# Patient Record
Sex: MALE | Race: BLACK OR AFRICAN AMERICAN | NOT HISPANIC OR LATINO | Employment: FULL TIME | URBAN - METROPOLITAN AREA
[De-identification: names, ages, dates, MRNs, and addresses within clinical notes are randomized per-mention and may not be internally consistent; named-entity substitution may affect disease eponyms.]

---

## 2023-02-22 ENCOUNTER — TELEPHONE (OUTPATIENT)
Dept: OTHER | Facility: OTHER | Age: 43
End: 2023-02-22

## 2023-02-22 NOTE — TELEPHONE ENCOUNTER
Patient called in to schedule NP OV with provider, Dr Santos Rutherford post spouse's visit  Please follow up and schedule patient  New chart created, insurance information and emergency contact, email address, pharmacy, and link sent to set up My Chart account

## 2023-02-27 ENCOUNTER — OFFICE VISIT (OUTPATIENT)
Dept: FAMILY MEDICINE CLINIC | Facility: CLINIC | Age: 43
End: 2023-02-27

## 2023-02-27 VITALS
HEART RATE: 87 BPM | BODY MASS INDEX: 32.58 KG/M2 | RESPIRATION RATE: 18 BRPM | OXYGEN SATURATION: 99 % | HEIGHT: 69 IN | DIASTOLIC BLOOD PRESSURE: 69 MMHG | WEIGHT: 220 LBS | TEMPERATURE: 98.4 F | SYSTOLIC BLOOD PRESSURE: 126 MMHG

## 2023-02-27 DIAGNOSIS — Z00.00 ANNUAL PHYSICAL EXAM: Primary | ICD-10-CM

## 2023-02-27 DIAGNOSIS — Z13.6 SCREENING FOR CARDIOVASCULAR CONDITION: ICD-10-CM

## 2023-02-27 DIAGNOSIS — R39.15 URINARY URGENCY: ICD-10-CM

## 2023-02-27 DIAGNOSIS — E78.2 MIXED HYPERLIPIDEMIA: ICD-10-CM

## 2023-02-27 DIAGNOSIS — S49.91XS RIGHT SHOULDER INJURY, SEQUELA: ICD-10-CM

## 2023-02-27 DIAGNOSIS — Z11.59 NEED FOR HEPATITIS C SCREENING TEST: ICD-10-CM

## 2023-02-27 DIAGNOSIS — Z13.29 SCREENING FOR THYROID DISORDER: ICD-10-CM

## 2023-02-27 DIAGNOSIS — Z13.1 SCREENING FOR DIABETES MELLITUS: ICD-10-CM

## 2023-02-27 DIAGNOSIS — R79.89 ELEVATED SERUM CREATININE: ICD-10-CM

## 2023-02-27 DIAGNOSIS — Z11.4 SCREENING FOR HIV (HUMAN IMMUNODEFICIENCY VIRUS): ICD-10-CM

## 2023-02-27 DIAGNOSIS — R06.83 SNORING: ICD-10-CM

## 2023-02-27 RX ORDER — LIDOCAINE 50 MG/G
PATCH TOPICAL
COMMUNITY
Start: 2023-02-15

## 2023-02-27 NOTE — PROGRESS NOTES
ADULT [facility]NUAL [facility]    NAME: Chadd Knight  AGE: 40 y o  SEX: male  : 1980     DATE: 3/1/2023     Assessment and Plan:     Problem List Items Addressed This Visit    None  Visit Diagnoses     Annual physical exam    -  Primary  Screening labs ordered  He is agreeable to HIV and hep c screening  Discussed diet and exercise  Immunizations are up to date per patient (had for his employer)  Snoring      epworth sleep score is 11  Refer for home sleep study and f/u with sleep specialist pending results  Sitting and reading: Moderate chance of dozing  Watching TV: High chance of dozing  Sitting, inactive in a public place (e g  a theatre or a meeting): Moderate chance of dozing  As a passenger in a car for an hour without a break: Slight chance of dozing  Lying down to rest in the afternoon when circumstances permit: Slight chance of dozing  Sitting and talking to someone: Would never doze  Sitting quietly after a lunch without alcohol: Slight chance of dozing  In a car, while stopped for a few minutes in traffic: Slight chance of dozing  Total score: 11      BMI 32 0-32 9,adult      Patient requested referral to weight management       Need for hepatitis C screening test        Screening for HIV (human immunodeficiency virus)        Screening for cardiovascular condition        Screening for diabetes mellitus        Screening for thyroid disorder        Urinary urgency  Will check urinalysis with his labs  Right shoulder injury, sequela      Secondary to work injury  Patient has appt with othopedics at [facility] in 2 days  Immunizations and preventive care screenings were discussed with patient today  Appropriate education was printed on patient's after visit summary  Discussed risks and benefits of prostate cancer screening   We discussed the controversial history of PSA screening for prostate cancer in the [place] as well as the risk of over detection and over treatment of prostate cancer by way of PSA screening  The patient understands that PSA blood testing is an imperfect way to screen for prostate cancer and that elevated PSA levels in the blood may also be caused by infection, inflammation, prostatic trauma or manipulation, urological procedures, or by benign prostatic enlargement  The role of the digital rectal examination in prostate cancer screening was also discussed and I discussed with him that there is large interobserver variability in the findings of digital rectal examination  Counseling:  Alcohol/drug use: discussed moderation in alcohol intake, the recommendations for healthy alcohol use, and avoidance of illicit drug use  Dental Health: discussed importance of regular tooth brushing, flossing, and dental visits  Injury prevention: discussed safety/seat belts, safety helmets, smoke detectors, carbon dioxide detectors, and smoking near bedding or upholstery  Exercise: the importance of regular exercise/physical activity was discussed  Recommend exercise 3-5 times per week for at least 30 minutes  No follow-ups on file  Chief Complaint:     Chief Complaint   Patient presents with   • New Patient Visit     Establish care, snoring while sleeping very bad cant sleep-has woken up gasping for air, pt states he rotates work schedules and cant find ease to sleep  Patient is a   Requests to see nutritionist- feels that he needs to slim down a little from where he is and a healthier life style  Request refill on patch from work related injury for shoulder at night  Taking OTC motrin       History of Present Illness:     Adult Annual Physical   Patient here today as a new patient for a comprehensive physical exam    Previous PCP = none  Immunizations=reportedly up to date  Thinks is tetanus vaccine when he started new job 2 years ago  He is a   Chronic medical problems=none    The patient reports snoring  states that he has snored more heavily in last 18 months  He has been having what sound like apneic spells  He states that he will wake up at night gasping for air  Not necessarily tired upon awakening in AM  Gets tired during the day  He is also concerned about some urinary urgency he has been having for the last year  He drinks 5-8 bottles of water per day  As a result of this will urinate frequently  When he has to go, has the sensation of urgency  States that this typically only occurs when he is drinking more than 4 bottles of water per day  No incontinence  No dysuria  No hematuria  Occasional nocturia  He was seen in ED on 2/14/23 at [facility] for an injury that occurred at work  No records for review  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  He reportedly had xray of wrist completed there but no other imaging studies  Was given rx for lidocaine patch but has not picked this up  Has appt at [facility] to see orthopedist in 2 days  Some pain in neck and right posterior shoulder today  No radiation of his pain down right upper extremity  Right shoulder is painful with certain movements  He is right hand dominant  He is out of work right now until evaluated by the orthopedist          Diet and Physical Activity  Diet/Nutrition: states that he could have a healthier diet  no fast foods  occasional juice  no soda  likes dessert      Exercise: runs, boxes and lifts weights for exercise         Depression Screening  PHQ-2/9 Depression Screening    Little interest or pleasure in doing things: 0 - not at all  Feeling down, depressed, or hopeless: 0 - not at all  PHQ-2 Score: 0  PHQ-2 Interpretation: Negative depression screen       General Health  Sleep: sleeps 4-5 hours per night  wakes up from his snoring  Hearing: normal - bilateral   Vision: no vision problems  Dental: regular dental visits          Health  Symptoms include: urinary urgency     Review of Systems:     Review of Systems   Past Medical History:     History reviewed  No pertinent past medical history  Past Surgical History:     History reviewed  No pertinent surgical history  Family History:     Family History   Problem Relation Age of Onset   • Hyperlipidemia Mother       Social History:     Social History     Socioeconomic History   • Marital status: /Civil Union     Spouse name: None   • Number of children: None   • Years of education: None   • Highest education level: None   Occupational History   • None   Tobacco Use   • Smoking status: Never   • Smokeless tobacco: Never   Vaping Use   • Vaping Use: Never used   Substance and Sexual Activity   • Alcohol use: Never   • Drug use: Never   • Sexual activity: Yes     Partners: Female     Birth control/protection: None   Other Topics Concern   • None   Social History Narrative    Is a          Social Determinants of Health     Financial Resource Strain: Not on file   Food Insecurity: Not on file   Transportation Needs: Not on file   Physical Activity: Not on file   Stress: Not on file   Social Connections: Not on file   Intimate Partner Violence: Not on file   Housing Stability: Not on file      Current Medications:     Current Outpatient Medications   Medication Sig Dispense Refill   • lidocaine (LIDODERM) 5 %        No current facility-administered medications for this visit  Allergies:     No Known Allergies   Physical Exam:     /69 (BP Location: Left arm, Patient Position: Sitting, Cuff Size: Adult)   Pulse 87   Temp 98 4 °F (36 9 °C) (Tympanic)   Resp 18   Ht 5' 9" (1 753 m)   Wt 99 8 kg (220 lb)   SpO2 99%   BMI 32 49 kg/m²     Physical Exam  Vitals and nursing note reviewed  Constitutional:       General: He is not in acute distress  Appearance: Normal appearance  He is not ill-appearing, toxic-appearing or diaphoretic     HENT: Head: Normocephalic and atraumatic  Right Ear: Tympanic membrane normal       Left Ear: Tympanic membrane normal       Nose: Nose normal       Mouth/Throat:      Mouth: Mucous membranes are moist       Pharynx: No oropharyngeal exudate or posterior oropharyngeal erythema  Eyes:      Extraocular Movements: Extraocular movements intact  Conjunctiva/sclera: Conjunctivae normal       Pupils: Pupils are equal, round, and reactive to light  Cardiovascular:      Rate and Rhythm: Normal rate and regular rhythm  Heart sounds: No murmur heard  Pulmonary:      Effort: Pulmonary effort is normal  No respiratory distress  Breath sounds: Normal breath sounds  No wheezing or rales  Abdominal:      General: Abdomen is flat  Bowel sounds are normal  There is no distension  Palpations: Abdomen is soft  There is no mass  Tenderness: There is no abdominal tenderness  There is no guarding  Musculoskeletal:      Cervical back: Normal range of motion and neck supple  Comments: Cervical spine with full range of motion  There is no tenderness or paravertebral muscle spasm on right  Has right sided trapezius spasm and tenderness  He has full flexion, abduction and external rotation of right shoulder  Decreased internal rotation  Negative empty can test    Negative apprehension test    Right wrist with tenderness dorsum of wrist  No swelling or deformity of wrist     Lymphadenopathy:      Cervical: No cervical adenopathy  Skin:     General: Skin is warm and dry  Findings: No rash  Neurological:      General: No focal deficit present  Mental Status: He is alert and oriented to person, place, and time  Cranial Nerves: No cranial nerve deficit  Sensory: No sensory deficit  Motor: No weakness        Coordination: Coordination normal       Gait: Gait normal       Deep Tendon Reflexes: Reflexes normal    Psychiatric:         Mood and Affect: Mood normal           Bruce Galvez [facility]

## 2023-03-03 ENCOUNTER — TELEPHONE (OUTPATIENT)
Dept: FAMILY MEDICINE CLINIC | Facility: CLINIC | Age: 43
End: 2023-03-03

## 2023-03-03 PROBLEM — E78.2 MIXED HYPERLIPIDEMIA: Status: ACTIVE | Noted: 2023-03-03

## 2023-03-03 PROBLEM — R79.89 ELEVATED SERUM CREATININE: Status: ACTIVE | Noted: 2023-03-03

## 2023-03-03 LAB
ALBUMIN SERPL-MCNC: 5 G/DL (ref 4–5)
ALBUMIN/GLOB SERPL: 2.2 {RATIO} (ref 1.2–2.2)
ALP SERPL-CCNC: 63 IU/L (ref 44–121)
ALT SERPL-CCNC: 15 IU/L (ref 0–44)
AST SERPL-CCNC: 15 IU/L (ref 0–40)
BILIRUB SERPL-MCNC: 0.8 MG/DL (ref 0–1.2)
BUN SERPL-MCNC: 14 MG/DL (ref 6–24)
BUN/CREAT SERPL: 10 (ref 9–20)
CALCIUM SERPL-MCNC: 10 MG/DL (ref 8.7–10.2)
CHLORIDE SERPL-SCNC: 102 MMOL/L (ref 96–106)
CHOLEST SERPL-MCNC: 230 MG/DL (ref 100–199)
CHOLEST/HDLC SERPL: 4.6 RATIO (ref 0–5)
CO2 SERPL-SCNC: 26 MMOL/L (ref 20–29)
CREAT SERPL-MCNC: 1.41 MG/DL (ref 0.76–1.27)
EGFR: 64 ML/MIN/1.73
GLOBULIN SER-MCNC: 2.3 G/DL (ref 1.5–4.5)
GLUCOSE SERPL-MCNC: 82 MG/DL (ref 70–99)
HCV AB S/CO SERPL IA: NON REACTIVE
HDLC SERPL-MCNC: 50 MG/DL
HIV 1+2 AB+HIV1 P24 AG SERPL QL IA: NON REACTIVE
LDLC SERPL CALC-MCNC: 171 MG/DL (ref 0–99)
POTASSIUM SERPL-SCNC: 4.8 MMOL/L (ref 3.5–5.2)
PROT SERPL-MCNC: 7.3 G/DL (ref 6–8.5)
SL AMB VLDL CHOLESTEROL CALC: 9 MG/DL (ref 5–40)
SODIUM SERPL-SCNC: 139 MMOL/L (ref 134–144)
TRIGL SERPL-MCNC: 56 MG/DL (ref 0–149)
TSH SERPL DL<=0.005 MIU/L-ACNC: 1 UIU/ML (ref 0.45–4.5)

## 2023-03-03 NOTE — TELEPHONE ENCOUNTER
patient calls back for results,patient aware of results:  patient confirmed he was fasting for labs, states he occasionally takes Advil or aleve but not everyday-for example,he will take a week and then not take for months  States that he has taking protein and vitamins in the past but nothing current  patient aware to recheck kidney function in 2 month and lipid in 6 months followed by an OV, advised patient to please call if any questions arries  M FOR PT TO CALL BACK FOR RESULTS    ----- Message from Elie Sue DO sent at 3/3/2023  8:09 AM EST -----  Can let patient know that I received his lab test results  His HIV test was negative  Hep c screening also negative  His blood sugar was normal  Thyroid was normal    His kidney function was slightly diminished  I have no prior labs for comparison  He is not taking any prescription medications that would affect kidney function  Wondering if he has been taking otc advil or aleve on regular basis? Or any other supplements? Could be that he is just a little dry/dehydrated  In addition, his cholesterol was elevated  Total cholesterol was 230 and LDL cholesterol was 171  Do not recommend medication at this time as his cardiovascular risks score is low at 3 3% but do recommend that he follow a low fat, low cholesterol/heart healthy diet  Recheck his lipids in 6 months followed by appt to review the labs  I would like to recheck kidney function sooner (2 months) to ensure this remains stable

## 2023-03-28 ENCOUNTER — TELEPHONE (OUTPATIENT)
Dept: SLEEP CENTER | Facility: CLINIC | Age: 43
End: 2023-03-28

## 2023-03-28 NOTE — TELEPHONE ENCOUNTER
----- Message from Donna Becerra MD sent at 3/28/2023  8:54 AM EDT -----  Approved    ----- Message -----  From: Waldemar Cruz  Sent: 3/28/2023   8:13 AM EDT  To: Sleep Medicine [facility] Provider    This Home sleep study needs approval      If approved please sign and return to clerical pool  If denied please include reasons why  Also provide alternative testing if warranted  Please sign and return to clerical pool

## 2023-04-25 ENCOUNTER — HOSPITAL ENCOUNTER (OUTPATIENT)
Dept: SLEEP CENTER | Facility: HOSPITAL | Age: 43
Discharge: HOME | End: 2023-04-25

## 2023-04-25 DIAGNOSIS — R06.83 SNORING: ICD-10-CM

## 2023-04-28 NOTE — PROGRESS NOTES
Home Sleep Study Documentation    HOME STUDY DEVICE: Noxturnal no                                           Babs G3 yes      Pre-Sleep Home Study:    Set-up and instructions performed by: MA-Tech    Technician performed demonstration for Patient: yes    Return demonstration performed by Patient: yes    Written instructions provided to Patient: yes    Patient signed consent form: yes        Post-Sleep Home Study:    Additional comments by Patient:         Home Sleep Study Failed:no:    Failure reason: N/A    Reported or Detected: N/A    Scored by: ANISHA Dillon

## 2023-05-01 ENCOUNTER — TELEPHONE (OUTPATIENT)
Dept: FAMILY MEDICINE CLINIC | Facility: CLINIC | Age: 43
End: 2023-05-01

## 2023-05-01 NOTE — TELEPHONE ENCOUNTER
----- Message from Joanie Mills DO sent at 4/28/2023  1:25 PM EDT -----  Can let patient know that his sleep study showed sleep apnea  Sleep specialist should be contacting patient to set up CPAP

## 2023-05-01 NOTE — TELEPHONE ENCOUNTER
Results -    Patient is aware of sleep study results and advice  Patient aware to await call from sleep specialist to set up CPAP

## 2023-05-10 ENCOUNTER — TELEPHONE (OUTPATIENT)
Dept: SLEEP CENTER | Facility: CLINIC | Age: 43
End: 2023-05-10

## 2023-05-10 NOTE — TELEPHONE ENCOUNTER
Per Dr Emma Vaughn sleep study shows moderate MICHELLE and PCP notified the patient of sleep study results      Spoke to the patient scheduled consult

## 2023-05-17 ENCOUNTER — TELEPHONE (OUTPATIENT)
Dept: SLEEP CENTER | Facility: CLINIC | Age: 43
End: 2023-05-17

## 2023-05-17 ENCOUNTER — OFFICE VISIT (OUTPATIENT)
Dept: SLEEP CENTER | Facility: CLINIC | Age: 43
End: 2023-05-17

## 2023-05-17 VITALS
DIASTOLIC BLOOD PRESSURE: 76 MMHG | HEIGHT: 70 IN | OXYGEN SATURATION: 99 % | BODY MASS INDEX: 31.07 KG/M2 | WEIGHT: 217 LBS | HEART RATE: 67 BPM | SYSTOLIC BLOOD PRESSURE: 118 MMHG

## 2023-05-17 DIAGNOSIS — J30.2 SEASONAL ALLERGIES: ICD-10-CM

## 2023-05-17 DIAGNOSIS — G47.26 SHIFT WORK SLEEP DISORDER: ICD-10-CM

## 2023-05-17 DIAGNOSIS — G47.33 OBSTRUCTIVE SLEEP APNEA: Primary | ICD-10-CM

## 2023-05-17 DIAGNOSIS — E66.9 OBESITY (BMI 30-39.9): ICD-10-CM

## 2023-05-17 DIAGNOSIS — G47.09 OTHER INSOMNIA: ICD-10-CM

## 2023-05-17 DIAGNOSIS — G47.10 HYPERSOMNOLENCE: ICD-10-CM

## 2023-05-17 NOTE — PATIENT INSTRUCTIONS
Nasal symptoms may [improve] with regular nasal saline rinse up to twice a day, followed by topical nasal steroid (e g  OTC Flonase, Nasacort) once a day if necessary  Nursing Support:  When: Monday through Friday 7A-5PM except holidays  Where: Our direct line is [phone]  If you are having a true emergency please call 911  In the event that the line is busy or it is after hours please leave a voice message and we will return your call  Please speak clearly, leaving your full name, birth date, best number to reach you and the reason for your call  Medication refills: We will need the name of the medication, the dosage, the ordering provider, whether you get a 30 or 90 day refill, and the pharmacy name and address  Medications will be ordered by the provider only  Nurses cannot call in prescriptions  Please allow 7 days for medication refills  Physician requested updates: If your provider requested that you call with an update after starting medication, please be ready to provide us the medication and dosage, what time you take your medication, the time you attempt to fall asleep, time you fall asleep, when you wake up, and what time you get out of bed  Sleep Study Results: We will contact you with sleep study results and/or next steps after the physician has reviewed your testing

## 2023-05-17 NOTE — TELEPHONE ENCOUNTER
DME setup 6/6/2023 in [facility]  Rx for CPAP and clinicals sent to [facility] via [place]

## 2023-05-17 NOTE — PROGRESS NOTES
Consultation - Sleep Center   Marie Broussard  40 y o  male  :1980  CQZ:[id number]  DOS:2023    Physician Requesting Consult: Kateryna Meneses DO             Reason for Consult : At your kind request I saw Marie Broussard for initial sleep evaluation today  Home sleep testing was undertaken to evaluate for sleep disordered breathing and patient is here to review results and further options  The study demonstrated a respiratory event index (CAL) of 13 2  The lowest SpO2 recorded is 86% and 12 minutes during the study was spent with oxygen saturations below 90%  The snore index was 10%  PFSH, Problem List, Medications & Allergies were reviewed in EMR  Nell Dey  has no past medical history on file  He has a current medication list which includes the following prescription(s): lidocaine  HPI: His study was undertaken for wife's complaints of loud snoring and witnessed apneas of several years duration  He is not aware of snoring or breathing difficulties during sleep  Other Complaints: He works rotating shifts and has sleep difficulties when he is on nights    Restless Leg Syndrome: reports no suggestive symptoms  Parasomnia: no features reported    Sleep Routine (on average): Typical Bedtime: Between 10 and 11 PM gets OOB: Between 5 and 6 AM TIB:6-7 hrs  Sleep latency:<  30 minutes Sleep Interruptions:2-3/nite struggles to fall back asleep  Awakens: spontaneously  Upon awakening: is not always refreshed  [He estimates getting 4-5 hrs sleep when working days and even less when he works nights   ] Asael reports excessive sleepiness and struggles to get through night shift  He takes planned naps during his lunch breaks and on review, rated [himself] at 17 /24 on the [place] Sleepiness Scale  Habits:   reports that he has never smoked  He has never used smokeless tobacco ;  reports no history of alcohol use ;[ reports no history of drug use  ];[  E-Cigarette/Vaping   • "E-Cigarette Use Never User    ]; Caffeine use:moderate; Exercise routine: regular  Family History: Both parents snore  ROS: Significant for weight has been stable  He has nasal symptoms due to seasonal allergies  A 10 point review of systems was otherwise negative  EXAM:  /76 (BP Location: Right arm, Patient Position: Sitting, Cuff Size: Standard)   Pulse 67   Ht 5' 10\" (1 778 m)   Wt 98 4 kg (217 lb)   SpO2 99%   BMI 31 14 kg/m²    General: Well groomed male, well appearing, in no apparent distress  Neurological: Alert and orientated; cooperative; Cranial nerves intact;    Psychiatric: Speech: Clear and coherent; normal mood, affect & thought   Skin: Warm and dry; Color& Hydration good; no facial rashes or lesions   HEENT:  Craniofacial anatomy: normal Sinuses: Non-tender  TMJ: Normal   Eyes: EOM's intact; conjunctiva/corneas clear   Ears: Externally appear normal     Nasal Airway: is patent Septum: Intact; Mucosa: Normal; Turbinates: Normal; Rhinorrhea: None  Mouth: Lips: Normal posture; Dentition: normal   Mucosa: Moist; Hard Palate:normal    Oropharryx: crowded and AP narrowing Tongue: Mallampati:Class IV, Mobile and ScallopedSoft Palate:  redundant  Tonsils: absent  Neck:; [Neck Circumference: 16 \";] Supple; no abnormal masses; Thyroid: Normal  Trachea: Central     Lymph: No cervical or submandibular Lymhadenopathy  Heart: S1,S2 normal; RRR; no gallop; no murmur s  Lungs: Respiratory Effort: Normal  Air entry good bilaterally  No wheezes  No rales  Abdomen: Obese, soft & non-tender    Extremities: No pedal edema  No clubbing or cyanosis  Musculoskeletal:  Motor normal; Gait: Normal        IMPRESSION: Primary/Secondary Sleep Diagnoses (to Medical or Psych conditions) & Comorbidities   1  Obstructive sleep apnea  Cpap DME      2  Other insomnia        3  Shift work sleep disorder        4  Hypersomnolence        5  Seasonal allergies        6   Obesity (BMI 30-39 9)         " "    PLAN:  1  I reviewed results of the Sleep studies with the patient  2  With respect to above conditions, I counseled on pathophysiology, diagnosis, treatment options, risks and benefits; inter-relationship and effects on symptoms and comorbidities; risks of no treatment; costs and insurance aspects  3  Patient elected positive airway pressure therapy and care coordinated with the DME provider for set-up  4  Need for compliance with therapy and weight reduction were emphasized  5   Multi component Cognitive behavioral therapy for Insomnia undertaken - Sleep Restriction, Stimulus control, Relaxation techniques and Sleep hygiene were discussed  6   He would like to change to regular shift is unable to at present  I advised on strategies to cope with shiftwork  7   Nasal symptoms may improve with regular nasal saline rinse up to twice a day, followed by topical nasal steroid (e g  OTC Flonase, Nasacort) once a day if necessary  8  Follow-up to be scheduled in 2 months to monitor compliance, progress and to adjust therapy  Thank you for allowing me to participate in the care of this patient  I will keep you apprised of developments  Sincerely,      Authenticated electronically on 78/63/60   Board Certified Specialist     Portions of the record may have been created with voice recognition software  Occasional wrong word or \"sound a like\" substitutions may have occurred due to the inherent limitations of voice recognition software  There may also be notations and random deletions of words or characters from malfunctioning software  Read the chart carefully and recognize, using context, where substitutions/deletions have occurred       "

## 2023-05-18 LAB
DME PARACHUTE DELIVERY DATE EXPECTED: NORMAL
DME PARACHUTE DELIVERY DATE REQUESTED: NORMAL
DME PARACHUTE DELIVERY NOTE: NORMAL
DME PARACHUTE ITEM DESCRIPTION: NORMAL
DME PARACHUTE ORDER STATUS: NORMAL
DME PARACHUTE SUPPLIER NAME: NORMAL
DME PARACHUTE SUPPLIER PHONE: NORMAL

## 2023-06-06 LAB

## 2023-06-07 ENCOUNTER — TELEPHONE (OUTPATIENT)
Dept: PULMONOLOGY | Facility: CLINIC | Age: 43
End: 2023-06-07

## 2023-06-07 NOTE — TELEPHONE ENCOUNTER
Pt  was set up on 6/6/2023 by Radha Bassett on a ResMed S10 CPAP 5-15 cm EPR 3 and mask AirTouch F20 M

## 2023-08-31 ENCOUNTER — TELEPHONE (OUTPATIENT)
Dept: SLEEP CENTER | Facility: CLINIC | Age: 43
End: 2023-08-31

## 2023-08-31 DIAGNOSIS — G47.33 OBSTRUCTIVE SLEEP APNEA (ADULT) (PEDIATRIC): Primary | ICD-10-CM

## 2023-09-01 ENCOUNTER — TELEPHONE (OUTPATIENT)
Dept: SLEEP CENTER | Facility: CLINIC | Age: 43
End: 2023-09-01

## 2023-09-06 ENCOUNTER — OFFICE VISIT (OUTPATIENT)
Dept: SLEEP CENTER | Facility: CLINIC | Age: 43
End: 2023-09-06
Payer: COMMERCIAL

## 2023-09-06 VITALS
WEIGHT: 220.4 LBS | DIASTOLIC BLOOD PRESSURE: 74 MMHG | HEIGHT: 70 IN | SYSTOLIC BLOOD PRESSURE: 110 MMHG | BODY MASS INDEX: 31.55 KG/M2

## 2023-09-06 DIAGNOSIS — G47.33 OBSTRUCTIVE SLEEP APNEA (ADULT) (PEDIATRIC): Primary | ICD-10-CM

## 2023-09-06 DIAGNOSIS — G47.09 OTHER INSOMNIA: ICD-10-CM

## 2023-09-06 DIAGNOSIS — R40.0 DAYTIME SLEEPINESS: ICD-10-CM

## 2023-09-06 DIAGNOSIS — J30.2 SEASONAL ALLERGIES: ICD-10-CM

## 2023-09-06 DIAGNOSIS — E66.9 OBESITY (BMI 30-39.9): ICD-10-CM

## 2023-09-06 DIAGNOSIS — G89.4 CHRONIC PAIN DISORDER: ICD-10-CM

## 2023-09-06 LAB

## 2023-09-06 PROCEDURE — 99214 OFFICE O/P EST MOD 30 MIN: CPT | Performed by: INTERNAL MEDICINE

## 2023-09-06 NOTE — PROGRESS NOTES
Follow-Up Note - Sleep Center   Pavan Kiran. Mack Wilson  40 y.o. male  :1980  XTB:[id number]  DOS:2023    CC: I saw this patient for follow-up in clinic today for Sleep disordered breathing, Coexisting Sleep and Medical Problems. Interval changes: Auto titrating PAP was prescribed but he has not been able to use the device because of recent nerve injury involving head and neck. The study demonstrated a respiratory event index (CAL) of 13.2. The lowest SpO2 recorded is 86% and 12 minutes during the study was spent with oxygen saturations below 90%. The snore index was 10%. PFSH, Problem List, Medications & Allergies were reviewed in EMR. He  has no past medical history on file. He has a current medication list which includes the following prescription(s): gabapentin, meloxicam, and lidocaine. PHYSIOLOGICAL DATA REVIEW : Discontinued use of PAP approximately a month ago.;.    SUBJECTIVE: With respect to use of PAP, Asael  is experiencing significant adverse effects:mask discomfort. He was refitted and feels he will be able to reinitiate CPAP. Taylor Linares Sleep Routine: Abigail Jarvis reports he has been out of work and now getting 4-5 hrs sleep because of neck pain.; he has difficulty initiating and maintaining sleep . He arises spontaneously and is not always refreshed. Asael reports excessive daytime sleepiness, [feels like napping & does when has the opportunity.] He rated [himself] at   /24 on the [place] Sleepiness Scale. Other issues: None reported. Habits:[ reports that he has never smoked. He has never used smokeless tobacco.], [ reports no history of alcohol use.], [ reports no history of drug use.], Caffeine use:very little; Exercise routine: regular-in physical therapy. ROS: Significant for weight fluctuates in the range of a few pounds. Allergies are controlled. Taylor Linares     EXAM: /74   Ht 5' 10" (1.778 m)   Wt 100 kg (220 lb 6.4 oz)   BMI 31.62 kg/m²     Wt Readings from Last 3 Encounters:   09/06/23 100 kg (220 lb 6.4 oz)   05/17/23 98.4 kg (217 lb)   02/27/23 99.8 kg (220 lb)      Patient is well groomed; well appearing. CNS: Alert, orientated, speech clear & coherent  Psych: cooperative and in no distress. Mental state: Appears normal.  H&N: EOMI; NC/AT: No facial pressure marks, no rashes. Skin/Extrem: col & hydration normal; no edema  Resp: Respiratory effort is normal  Physical findings otherwise essentially unchanged from previous. IMPRESSION: Problem List Items & Comorbidities Addressed this Visit    1. Obstructive sleep apnea (adult) (pediatric)  PAP DME Resupply/Reorder      2. Other insomnia        3. Chronic pain disorder        4. Daytime sleepiness        5. Seasonal allergies        6. Obesity (BMI 30-39. 9)            PLAN:  1. I reviewed results of prior studies and physiologic data with the patient. 2. I discussed treatment options with risks and benefits. 3. Treatment with  PAP is medically necessary and Brandy Yang is agreable to reinitiate use. 4. Care of equipment, methods to improve comfort using PAP and importance of compliance with therapy were discussed. 5. Pressure setting: continue 5-15 cmH2O.    6. Rx provided to replace supplies and Care coordinated with DME provider. 7. Multi component Cognitive behavioral therapy for Insomnia undertaken - Sleep Restriction, Stimulus control, Relaxation techniques and Sleep hygiene were discussed. 8. He is under care for chronic pain. 9. Discussed strategies for weight reduction. 10. Follow-up is advised in 3 months to monitor progress, compliance and to adjust therapy. Thank you for allowing me to participate in the care of this patient. Sincerely,     Authenticated electronically on 08/52/15   Board Certified Specialist     Portions of the record may have been created with voice recognition software.  Occasional wrong word or "sound a like" substitutions may have occurred due to the inherent limitations of voice recognition software. There may also be notations and random deletions of words or characters from malfunctioning software. Read the chart carefully and recognize, using context, where substitutions/deletions have occurred.

## 2023-09-06 NOTE — PATIENT INSTRUCTIONS

## 2023-09-08 ENCOUNTER — TELEPHONE (OUTPATIENT)
Dept: SLEEP CENTER | Facility: CLINIC | Age: 43
End: 2023-09-08

## 2023-09-11 LAB

## 2023-09-12 ENCOUNTER — CONSULT (OUTPATIENT)
Dept: BARIATRICS | Facility: CLINIC | Age: 43
End: 2023-09-12
Payer: COMMERCIAL

## 2023-09-12 VITALS
HEART RATE: 70 BPM | RESPIRATION RATE: 16 BRPM | HEIGHT: 70 IN | BODY MASS INDEX: 31.32 KG/M2 | DIASTOLIC BLOOD PRESSURE: 84 MMHG | WEIGHT: 218.8 LBS | SYSTOLIC BLOOD PRESSURE: 118 MMHG

## 2023-09-12 DIAGNOSIS — E66.9 OBESITY, CLASS I, BMI 30-34.9: Primary | ICD-10-CM

## 2023-09-12 DIAGNOSIS — R06.83 SNORING: ICD-10-CM

## 2023-09-12 PROBLEM — M54.12 CERVICAL RADICULOPATHY: Status: ACTIVE | Noted: 2023-09-12

## 2023-09-12 PROCEDURE — 99204 OFFICE O/P NEW MOD 45 MIN: CPT | Performed by: PHYSICIAN ASSISTANT

## 2023-09-12 NOTE — PATIENT INSTRUCTIONS
Try these alternative food options:  Protein shakes- Premier, Pure protein, Fairlife, Iconic  Lactose free protein shakes-  Fairlife, Ripple, Iconic, Zambrano  Protein bars- Quest, Pure protein  Ice cream- Yves's, Yasso, Enlightened, Halo Top   Chips- Quest protein chips, Cheese crisps   Bread- 647 wheat, Protein Keto bread, whole wheat yelena bread, low carb/high protein wraps  Pasta- Chickpea pasta, Pasta zero or similar  Rice- Cauliflower rice, quinoa, wild rice, brown rice  Fruits- berries, cantaloupe, peaches, apples, oranges  Yogurt- Ratio (25g protein), Skyr, Two good, Chobani 60 sujit or 100 sujit, Oikos triple zero  Sugar alternatives- Stevia, Monk fruit, Swerve

## 2023-09-12 NOTE — PROGRESS NOTES
Assessment/Plan:    Obesity, Class I, BMI 30-34.9  -Discussed options of HealthyCORE-Intensive Lifestyle Intervention Program, Very Low Calorie Diet-VLCD and Conservative Program and the role of weight loss medications. Explained the importance of making lifestyle changes if utilizing medication to aid in weight loss  -Initial weight loss goal of 5-10% weight loss for improved health  -Screening labs and records reviewed from prior. Discussed the need to get recheck of BMP due to elevated creatinine in the past    -Patient is interested in pursuing conservative plan  -Calorie goals (1700), sample menu (6294-5136 calories), portion size guidelines, and food logging reviewed with the patient. Goals:  -Food log (ie.) [email or web address],[email or web address]  - To drink at least 80oz of water daily. No sugary beverages. -recommend trying to start fast earlier in the night such as 6-7PM.    -discussed trying to have protein based snack at night. Follow up in approximately 4 months  with Non-Surgical Physician/Advanced Practitioner. Diagnoses and all orders for this visit:    Obesity, Class I, BMI 30-34.9    Snoring  -     Ambulatory Referral to Weight Management    BMI 32.0-32.9,adult  -     Ambulatory Referral to Weight Management      I have spent a total time of 48 minutes on 09/12/23 in caring for this patient including Diagnostic results, Prognosis, Risks and benefits of tx options, Instructions for management, Patient and family education, Importance of tx compliance, Risk factor reductions, Impressions and Counseling / Coordination of care. Subjective:   Chief Complaint   Patient presents with   • Consult     MWM consult; waist 38.5in; goal wt 190; pt has sleep apnea       Patient ID: Sandra NaRojelio Stacy  is a 40 y.o. male with excess weight/obesity here to pursue weight management. History reviewed. No pertinent past medical history.     HPI: Here for MWM consult    He started to gain weight recently with a work injury in February. Recently, he started to change around his diet. He was stuck about 225lb. He has tried to increase water intake and cut back on sugars. He has apple watch and has been trying to hit 10,000 steps at least 3 x week. He has cut out most bread also and is doing intermittent fasting. Also started to do fiber, psyllium. Obesity/Excess Weight:  Severity: class I with MICHELLE and HLD  Onset:  Over the last 7 months    Modifiers: Diet and Exercise  Contributing factors: Insufficient Physical Activity  Associated symptoms: comorbid conditions      Goals:under 200lb  Hydration:4-8 bottles of water  Alcohol: none  Exercise:limited due to injuries  Occupation:rotating shifts, on short term disability  Sleep:MICHELLE  Dining out/takeout: about 40 % time      14 hour fast (varying on start time)  Starts to eat 10AM-12PM  Diet Recall:  L:Vegetables and meat  D:varies more  S:currently fruits and vegetables. Can do SF gum to help with cravings. Sometimes nuts    Colonoscopy-Not applicable      The following portions of the patient's history were reviewed and updated as appropriate:   He  has no past medical history on file. He   Patient Active Problem List    Diagnosis Date Noted   • Obesity, Class I, BMI 30-34.9 09/12/2023   • Cervical radiculopathy 09/12/2023   • Obstructive sleep apnea (adult) (pediatric) 08/31/2023   • Obstructive sleep apnea    • Snoring    • Mixed hyperlipidemia 03/03/2023   • Elevated serum creatinine 03/03/2023     He  has no past surgical history on file. His family history includes Hyperlipidemia in his mother. He  reports that he has never smoked. He has never used smokeless tobacco. He reports that he does not drink alcohol and does not use drugs.   Current Outpatient Medications   Medication Sig Dispense Refill   • GABAPENTIN PO Take by mouth     • lidocaine (LIDODERM) 5 %      • MELOXICAM PO Take by mouth       No current facility-administered medications for this visit. Current Outpatient Medications on File Prior to Visit   Medication Sig   • GABAPENTIN PO Take by mouth   • lidocaine (LIDODERM) 5 %    • MELOXICAM PO Take by mouth     No current facility-administered medications on file prior to visit. He has No Known Allergies. .    Review of Systems - Negative except neck pain, shoulder pain, wrist pain and numbness      Objective:    /84   Pulse 70   Resp 16   Ht 5' 10" (1.778 m)   Wt 99.2 kg (218 lb 12.8 oz)   BMI 31.39 kg/m²         Physical Exam  Vitals and nursing note reviewed. Constitutional:       General: He is not in acute distress. Appearance: He is well-developed. He is obese. HENT:      Head: Normocephalic and atraumatic. Eyes:      Conjunctiva/sclera: Conjunctivae normal.   Neck:      Thyroid: No thyromegaly. Pulmonary:      Effort: Pulmonary effort is normal. No respiratory distress. Musculoskeletal:      Comments: Right wrist brace   Skin:     Findings: No rash (visible). Neurological:      Mental Status: He is alert and oriented to person, place, and time.    Psychiatric:         Behavior: Behavior normal.

## 2023-09-12 NOTE — PROGRESS NOTES
Assessment/Plan:    No problem-specific Assessment & Plan notes found for this encounter. Follow up in approximately {FOLLOW UP:43945} with { Follow Up:90410}. Diagnoses and all orders for this visit:    Snoring  -     Ambulatory Referral to Weight Management    BMI 32.0-32.9,adult  -     Ambulatory Referral to Weight Management          Subjective:   Chief Complaint   Patient presents with   • Consult     MWM consult; waist; goal wt; S/B       Patient ID: Mukund Meeks. Orestes Mayberry  is a 40 y.o. male with excess weight/obesity here to pursue weight management. History reviewed. No pertinent past medical history. HPI: Here for MWM consult    Obesity/Excess Weight:  Severity: class I with MICHELLE and HLD  Onset:  ***    Modifiers: {MODIFIERS (Optional):99617}  Contributing factors: {CONTRIBUTING FACTORS (Optional):19455}  Associated symptoms: {ASSOCIATED SYMPTOMS (Optional):21915}      Goals:  Hydration:  Alcohol:   Exercise:  Occupation:  Sleep:  Dining out/takeout:    Colonoscopy-Not applicable      The following portions of the patient's history were reviewed and updated as appropriate:   He  has no past medical history on file. He   Patient Active Problem List    Diagnosis Date Noted   • Obstructive sleep apnea (adult) (pediatric) 08/31/2023   • Obstructive sleep apnea    • Snoring    • Mixed hyperlipidemia 03/03/2023   • Elevated serum creatinine 03/03/2023     He  has no past surgical history on file. His family history includes Hyperlipidemia in his mother. He  reports that he has never smoked. He has never used smokeless tobacco. He reports that he does not drink alcohol and does not use drugs. Current Outpatient Medications   Medication Sig Dispense Refill   • GABAPENTIN PO Take by mouth     • lidocaine (LIDODERM) 5 %      • MELOXICAM PO Take by mouth       No current facility-administered medications for this visit.      Current Outpatient Medications on File Prior to Visit   Medication Sig   • GABAPENTIN PO Take by mouth   • lidocaine (LIDODERM) 5 %    • MELOXICAM PO Take by mouth     No current facility-administered medications on file prior to visit. He has No Known Allergies. .    Review of Systems    Objective:    /84   Resp 16   Ht 5' 10" (1.778 m)   Wt 99.2 kg (218 lb 12.8 oz)   BMI 31.39 kg/m²     Physical Exam

## 2023-09-12 NOTE — ASSESSMENT & PLAN NOTE
-Discussed options of HealthyCORE-Intensive Lifestyle Intervention Program, Very Low Calorie Diet-VLCD and Conservative Program and the role of weight loss medications. Explained the importance of making lifestyle changes if utilizing medication to aid in weight loss  -Initial weight loss goal of 5-10% weight loss for improved health  -Screening labs and records reviewed from prior. Discussed the need to get recheck of BMP due to elevated creatinine in the past    -Patient is interested in pursuing conservative plan  -Calorie goals (1700), sample menu (7635-3330 calories), portion size guidelines, and food logging reviewed with the patient. Goals:  -Food log (ie.) [email or web address]  - To drink at least 80oz of water daily. No sugary beverages. -recommend trying to start fast earlier in the night such as 6-7PM.    -discussed trying to have protein based snack at night.

## 2023-12-11 ENCOUNTER — TELEPHONE (OUTPATIENT)
Dept: SLEEP CENTER | Facility: CLINIC | Age: 43
End: 2023-12-11

## 2024-01-24 LAB

## 2024-05-13 ENCOUNTER — OFFICE VISIT (OUTPATIENT)
Dept: FAMILY MEDICINE CLINIC | Facility: CLINIC | Age: 44
End: 2024-05-13
Payer: COMMERCIAL

## 2024-05-13 VITALS
OXYGEN SATURATION: 98 % | HEART RATE: 90 BPM | WEIGHT: 225 LBS | BODY MASS INDEX: 32.21 KG/M2 | RESPIRATION RATE: 18 BRPM | SYSTOLIC BLOOD PRESSURE: 112 MMHG | DIASTOLIC BLOOD PRESSURE: 72 MMHG | TEMPERATURE: 97.8 F | HEIGHT: 70 IN

## 2024-05-13 DIAGNOSIS — G47.33 OBSTRUCTIVE SLEEP APNEA: ICD-10-CM

## 2024-05-13 DIAGNOSIS — R00.2 PALPITATIONS: Primary | ICD-10-CM

## 2024-05-13 PROCEDURE — 93000 ELECTROCARDIOGRAM COMPLETE: CPT | Performed by: FAMILY MEDICINE

## 2024-05-13 PROCEDURE — 99214 OFFICE O/P EST MOD 30 MIN: CPT | Performed by: FAMILY MEDICINE

## 2024-05-13 NOTE — PROGRESS NOTES
"Name: Asael Ring      : 1980      MRN: [id number]  Encounter Provider: Renetta Sen DO  Encounter Date: 2024   Encounter department: [facility] PRIMARY [facility]    Assessment & Plan     1. Palpitations  -     POCT ECG  -     CBC and differential  -     TSH, 3rd generation with Free T4 reflex  -     Magnesium  -     Echo complete w/ contrast if indicated; Future; Expected date: 2024  -     Holter monitor; Future; Expected date: 2024  EKG in office today showed normal sinus rhythm with no acute changes  Check labs,   Send for holter and echo  F/u after above  2. Obstructive sleep apnea  Was unable to tolerate CPAP         Subjective     HPI  Patient is a 43 year old male with MICHELLE, hyperlipidemia, cervical radiculopathy who is being seen today for complaint of \"heart flutters\".     Gets intermittent \"flutter\" sensation in chest which started a few weeks ago. May be multiple times in one day and some days none at all. Sometimes has associated pressure in chest but most of the time, will get just flutters. Lasts seconds. Never any associated lightheadedness. No shortness of breath. Initially thought the pressure was indigestion and took tums with no relief. Under a lot of stress right now. The flutters don't seem to be correlated with his high stress days.     On gabapentin and meloxicam for a workman's comp injury. Not sure of doses so will call his pharmacy to get the doses for his chart.   Review of Systems    History reviewed. No pertinent past medical history.  History reviewed. No pertinent surgical history.  Family History   Problem Relation Age of Onset   • Hyperlipidemia Mother      Social History     Socioeconomic History   • Marital status: /Civil Union     Spouse name: None   • Number of children: None   • Years of education: None   • Highest education level: None   Occupational History   • None   Tobacco Use   • Smoking status: Never   • Smokeless " "tobacco: Never   Vaping Use   • Vaping status: Never Used   Substance and Sexual Activity   • Alcohol use: Never   • Drug use: Never   • Sexual activity: Yes     Partners: Female     Birth control/protection: None   Other Topics Concern   • None   Social History Narrative    Is a          Social Determinants of Health     Financial Resource Strain: Not on file   Food Insecurity: Not on file   Transportation Needs: Not on file   Physical Activity: Not on file   Stress: Not on file   Social Connections: Not on file   Intimate Partner Violence: Not on file   Housing Stability: Not on file     Current Outpatient Medications on File Prior to Visit   Medication Sig   • GABAPENTIN PO Take by mouth   • lidocaine (LIDODERM) 5 % Apply 1 patch topically if needed   • MELOXICAM PO Take by mouth     No Known Allergies  Immunization History   Administered Date(s) Administered   • COVID-19 PFIZER VACCINE 0.3 ML IM 11/12/2021, 12/03/2021       Objective     /72 (BP Location: Left arm, Patient Position: Sitting, Cuff Size: Adult)   Pulse 90   Temp 97.8 °F (36.6 °C) (Tympanic)   Resp 18   Ht 5' 10\" (1.778 m)   Wt 102 kg (225 lb)   SpO2 98%   BMI 32.28 kg/m²     Physical Exam  Vitals and nursing note reviewed.   Constitutional:       General: He is not in acute distress.     Appearance: Normal appearance. He is not ill-appearing, toxic-appearing or diaphoretic.   HENT:      Head: Normocephalic and atraumatic.   Eyes:      Conjunctiva/sclera: Conjunctivae normal.   Cardiovascular:      Rate and Rhythm: Normal rate and regular rhythm.      Heart sounds: No murmur heard.     No friction rub. No gallop.   Pulmonary:      Effort: Pulmonary effort is normal.      Breath sounds: Normal breath sounds.   Abdominal:      General: Abdomen is flat. Bowel sounds are normal.      Palpations: Abdomen is soft.   Musculoskeletal:      Cervical back: Normal range of motion and neck supple.      Right lower leg: No " edema.      Left lower leg: No edema.   Lymphadenopathy:      Cervical: No cervical adenopathy.   Skin:     General: Skin is warm and dry.      Findings: No rash.   Neurological:      General: No focal deficit present.      Mental Status: He is alert and oriented to person, place, and time.   Psychiatric:         Mood and Affect: Mood normal.   Renetta Sen, DO

## 2024-05-23 ENCOUNTER — HOSPITAL ENCOUNTER (OUTPATIENT)
Dept: NON INVASIVE DIAGNOSTICS | Age: 44
Discharge: HOME | End: 2024-05-23
Payer: COMMERCIAL

## 2024-05-23 DIAGNOSIS — R00.2 PALPITATIONS: ICD-10-CM

## 2024-05-23 PROCEDURE — 93226 XTRNL ECG REC<48 HR SCAN A/R: CPT

## 2024-05-23 PROCEDURE — 93225 XTRNL ECG REC<48 HRS REC: CPT

## 2024-06-04 ENCOUNTER — TELEPHONE (OUTPATIENT)
Dept: FAMILY MEDICINE CLINIC | Facility: CLINIC | Age: 44
End: 2024-06-04

## 2024-06-04 NOTE — TELEPHONE ENCOUNTER
Lvm advising patient to call back per dr denney message     ----- Message from Renetta Denney DO sent at 6/4/2024 10:18 AM EDT -----  Patient is scheduled tomorrow for f/u on his palpitations. Was advised to f/u after his holter and echo  His holter was done 5/23 but has not been read yet and his echo is scheduled for 6/21/21 so maybe it makes sense to reschedule this until after his test results come back.   Of course, if he is having any issues, happy to see him tomorrow.

## 2024-06-04 NOTE — TELEPHONE ENCOUNTER
----- Message from Renetta Sen DO sent at 6/4/2024 10:18 AM EDT -----  Patient is scheduled tomorrow for f/u on his palpitations. Was advised to f/u after his holter and echo  His holter was done 5/23 but has not been read yet and his echo is scheduled for 6/21/21 so maybe it makes sense to reschedule this until after his test results come back.   Of course, if he is having any issues, happy to see him tomorrow.

## 2024-06-05 ENCOUNTER — TELEPHONE (OUTPATIENT)
Dept: FAMILY MEDICINE CLINIC | Facility: CLINIC | Age: 44
End: 2024-06-05

## 2024-06-05 DIAGNOSIS — R00.2 PALPITATIONS: Primary | ICD-10-CM

## 2024-06-21 ENCOUNTER — HOSPITAL ENCOUNTER (OUTPATIENT)
Dept: NON INVASIVE DIAGNOSTICS | Age: 44
Discharge: HOME | End: 2024-06-21
Payer: COMMERCIAL

## 2024-06-21 VITALS
DIASTOLIC BLOOD PRESSURE: 72 MMHG | HEIGHT: 70 IN | HEART RATE: 59 BPM | SYSTOLIC BLOOD PRESSURE: 112 MMHG | WEIGHT: 225 LBS | BODY MASS INDEX: 32.21 KG/M2

## 2024-06-21 DIAGNOSIS — R00.2 PALPITATIONS: ICD-10-CM

## 2024-06-21 LAB
AORTIC ROOT: 3.5 CM
APICAL FOUR CHAMBER EJECTION FRACTION: 71 %
ASCENDING AORTA: 3.2 CM
BSA FOR ECHO PROCEDURE: 2.19 M2
E WAVE DECELERATION TIME: 150 MS
E/A RATIO: 1.16
FRACTIONAL SHORTENING: 35 (ref 28–44)
INTERVENTRICULAR SEPTUM IN DIASTOLE (PARASTERNAL SHORT AXIS VIEW): 1.1 CM
INTERVENTRICULAR SEPTUM: 1.1 CM (ref 0.6–1.1)
LAAS-AP2: 10.8 CM2
LAAS-AP4: 13.3 CM2
LEFT ATRIUM SIZE: 3.5 CM
LEFT ATRIUM VOLUME (MOD BIPLANE): 32 ML
LEFT ATRIUM VOLUME INDEX (MOD BIPLANE): 14.6 ML/M2
LEFT INTERNAL DIMENSION IN SYSTOLE: 2.6 CM (ref 2.1–4)
LEFT VENTRICLE DIASTOLIC VOLUME (MOD BIPLANE): 144 ML
LEFT VENTRICLE DIASTOLIC VOLUME INDEX (MOD BIPLANE): 65.8 ML/M2
LEFT VENTRICLE SYSTOLIC VOLUME (MOD BIPLANE): 43 ML
LEFT VENTRICLE SYSTOLIC VOLUME INDEX (MOD BIPLANE): 19.6 ML/M2
LEFT VENTRICULAR INTERNAL DIMENSION IN DIASTOLE: 4 CM (ref 3.5–6)
LEFT VENTRICULAR POSTERIOR WALL IN END DIASTOLE: 1.1 CM
LEFT VENTRICULAR STROKE VOLUME: 44 ML
LV EF: 70 %
LVSV (TEICH): 44 ML
MV E'TISSUE VEL-LAT: 15 CM/S
MV E'TISSUE VEL-SEP: 12 CM/S
MV PEAK A VEL: 0.58 M/S
MV PEAK E VEL: 67 CM/S
MV STENOSIS PRESSURE HALF TIME: 44 MS
MV VALVE AREA P 1/2 METHOD: 5
RIGHT ATRIUM AREA SYSTOLE A4C: 14.9 CM2
RIGHT VENTRICLE ID DIMENSION: 3.6 CM
SL CV LEFT ATRIUM LENGTH A2C: 3.7 CM
SL CV LV EF: 70
SL CV PED ECHO LEFT VENTRICLE DIASTOLIC VOLUME (MOD BIPLANE) 2D: 68 ML
SL CV PED ECHO LEFT VENTRICLE SYSTOLIC VOLUME (MOD BIPLANE) 2D: 24 ML
TRICUSPID ANNULAR PLANE SYSTOLIC EXCURSION: 2 CM

## 2024-06-21 PROCEDURE — 93306 TTE W/DOPPLER COMPLETE: CPT | Performed by: INTERNAL MEDICINE

## 2024-06-21 PROCEDURE — 93306 TTE W/DOPPLER COMPLETE: CPT

## 2024-06-24 ENCOUNTER — TELEPHONE (OUTPATIENT)
Dept: FAMILY MEDICINE CLINIC | Facility: CLINIC | Age: 44
End: 2024-06-24

## 2024-06-24 DIAGNOSIS — I51.7 LVH (LEFT VENTRICULAR HYPERTROPHY): ICD-10-CM

## 2024-06-24 DIAGNOSIS — R00.2 PALPITATIONS: Primary | ICD-10-CM

## 2024-06-24 NOTE — TELEPHONE ENCOUNTER
Called patient, patient verbalized understanding. Confirmed he has a virtual visit with Dr eSn and patient states he will discuss further with Dr Sen then, aware that Cardiology will reach out to schedule     ----- Message from Renetta Sen DO sent at 6/24/2024  1:26 PM EDT -----  Can let patient know that his echo showed mild thickening of hear muscle wall. This can come from uncontrolled hypertension but his bp has been well controlled here in our office.  Very important that he get labs including the tsh done.   Will have him consult with cardiology for the palpitations and hyperdynamic systolic function.

## 2024-07-02 NOTE — PROGRESS NOTES
"Virtual Regular Visit    Verification of patient location:    Patient is located at car in the following state in which I hold an active license [place]      Assessment/Plan:    Problem List Items Addressed This Visit    None  Visit Diagnoses       Palpitations    -  Primary    Relevant Orders    Basic metabolic panel    Mild concentric left ventricular hypertrophy (LVH)              Reviewed echo and holter results  Encouraged him to follow through with having labs. Will call with results  Refer cardiology for LVH, hyperdynamic LV function and palpitations       Reason for visit is   Chief Complaint   Patient presents with   • Follow-up     Wants to discuss his echo and holter results          Encounter provider Renetta Sen DO      Recent Visits  No visits were found meeting these conditions.  Showing recent visits within past 7 days and meeting all other requirements  Today's Visits  Date Type Provider Dept   07/03/24 Telemedicine Renetta Sen DO [facility]   Showing today's visits and meeting all other requirements  Future Appointments  No visits were found meeting these conditions.  Showing future appointments within next 150 days and meeting all other requirements       The patient was identified by name and date of birth. Asael Ring was informed that this is a telemedicine visit and that the visit is being conducted through the Epic Embedded platform. He agrees to proceed..  My office door was closed. No one else was in the room.  He acknowledged consent and understanding of privacy and security of the video platform. The patient has agreed to participate and understands they can discontinue the visit at any time.    Patient is aware this is a billable service.     Subjective  Asael Ring is a 43 y.o. male with hyperlipidemia, MICHELLE, cervical radiculopathy being seen today for review of echo/follow-up on palpitations.     Seen here on 5/13/24 for complaint of \"fluttering in " "chest\".   (Gets intermittent \"flutter\" sensation in chest which started a few weeks ago. May be multiple times in one day and some days none at all. Sometimes has associated pressure in chest but most of the time, will get just flutters. Lasts seconds. Never any associated lightheadedness. No shortness of breath. Initially thought the pressure was indigestion and took tums with no relief. Under a lot of stress right now. The flutters don't seem to be correlated with his high stress days. ) .    EKG done in office. Normal sinus rhythm at 69 bpm   Normal axis. No acute changes     Holter monitor ordered and completed. Showed :  The patient was in sinus rhythm throughout the duration of the study.  The average heart rate was 79 bpm.   Symptoms did not correspond to rhythm abnormalities.  Normal Holter.    Echo done on 6/21/24 showed:    •  Left Ventricle: Left ventricular cavity size is normal. Wall thickness is mildly increased. There is mild concentric hypertrophy. The left ventricular ejection fraction is 70%. Systolic function is vigorous. Wall motion is normal. Diastolic function is normal.  •  Right Ventricle: Right ventricular cavity size is normal. Systolic function is normal.  •  Left Atrium: The atrium is normal in size.  •  Right Atrium: The atrium is normal in size.     Still getting palpitations sporadically. On daily basis. May occur  anywhere from multiple times in an hour to just once an hour. Will last for quick second and resolves. Occurs both with rest and activity.   Labs ordered but have not been completed. He needs them sent to [facility]. They were sent to [facility].   Patient advised to consult with cardiology regarding his mild LV concentric hypertrophy and vigorous systolic function. Nothing scheduled.     HPI     History reviewed. No pertinent past medical history.    History reviewed. No pertinent surgical history.    Current Outpatient Medications   Medication Sig Dispense Refill   • GABAPENTIN PO " Take by mouth     • lidocaine (LIDODERM) 5 % Apply 1 patch topically if needed     • MELOXICAM PO Take by mouth       No current facility-administered medications for this visit.        No Known Allergies    Review of Systems    Video Exam    There were no vitals filed for this visit.    Physical Exam  Nursing note reviewed.   Constitutional:       General: He is not in acute distress.     Appearance: Normal appearance. He is not ill-appearing, toxic-appearing or diaphoretic.   HENT:      Head: Normocephalic and atraumatic.   Eyes:      Conjunctiva/sclera: Conjunctivae normal.   Pulmonary:      Effort: Pulmonary effort is normal. No respiratory distress.   Neurological:      Mental Status: He is alert.   Psychiatric:         Mood and Affect: Mood normal.          Visit Time  Total Visit Duration: 12

## 2024-07-03 ENCOUNTER — TELEMEDICINE (OUTPATIENT)
Dept: FAMILY MEDICINE CLINIC | Facility: CLINIC | Age: 44
End: 2024-07-03
Payer: COMMERCIAL

## 2024-07-03 DIAGNOSIS — I51.7 MILD CONCENTRIC LEFT VENTRICULAR HYPERTROPHY (LVH): ICD-10-CM

## 2024-07-03 DIAGNOSIS — R00.2 PALPITATIONS: Primary | ICD-10-CM

## 2024-07-03 PROCEDURE — 99213 OFFICE O/P EST LOW 20 MIN: CPT | Performed by: FAMILY MEDICINE

## 2024-07-07 LAB
BUN SERPL-MCNC: 12 MG/DL (ref 6–24)
BUN/CREAT SERPL: 9 (ref 9–20)
CALCIUM SERPL-MCNC: 10 MG/DL (ref 8.7–10.2)
CHLORIDE SERPL-SCNC: 102 MMOL/L (ref 96–106)
CO2 SERPL-SCNC: 22 MMOL/L (ref 20–29)
CREAT SERPL-MCNC: 1.3 MG/DL (ref 0.76–1.27)
EGFR: 70 ML/MIN/1.73
GLUCOSE SERPL-MCNC: 85 MG/DL (ref 70–99)
POTASSIUM SERPL-SCNC: 4.5 MMOL/L (ref 3.5–5.2)
SODIUM SERPL-SCNC: 139 MMOL/L (ref 134–144)